# Patient Record
Sex: MALE | Race: BLACK OR AFRICAN AMERICAN | URBAN - METROPOLITAN AREA
[De-identification: names, ages, dates, MRNs, and addresses within clinical notes are randomized per-mention and may not be internally consistent; named-entity substitution may affect disease eponyms.]

---

## 2018-10-09 ENCOUNTER — HISTORICAL (OUTPATIENT)
Dept: INTERNAL MEDICINE | Facility: CLINIC | Age: 58
End: 2018-10-09

## 2018-10-09 LAB
ABS NEUT (OLG): 1.4 X10(3)/MCL (ref 2.1–9.2)
ALBUMIN SERPL-MCNC: 3.9 GM/DL (ref 3.4–5)
ALBUMIN/GLOB SERPL: 1 RATIO (ref 1–2)
ALP SERPL-CCNC: 82 UNIT/L (ref 45–117)
ALT SERPL-CCNC: 78 UNIT/L (ref 12–78)
APPEARANCE, UA: CLEAR
AST SERPL-CCNC: 54 UNIT/L (ref 15–37)
BACTERIA #/AREA URNS AUTO: ABNORMAL /[HPF]
BASOPHILS # BLD AUTO: 0.04 X10(3)/MCL
BASOPHILS NFR BLD AUTO: 1 %
BILIRUB SERPL-MCNC: 0.4 MG/DL (ref 0.2–1)
BILIRUB UR QL STRIP: NEGATIVE
BILIRUBIN DIRECT+TOT PNL SERPL-MCNC: 0.1 MG/DL
BILIRUBIN DIRECT+TOT PNL SERPL-MCNC: 0.3 MG/DL
BUN SERPL-MCNC: 17 MG/DL (ref 7–18)
CALCIUM SERPL-MCNC: 9.2 MG/DL (ref 8.5–10.1)
CHLORIDE SERPL-SCNC: 98 MMOL/L (ref 98–107)
CHOLEST SERPL-MCNC: 209 MG/DL
CHOLEST/HDLC SERPL: 4.5 {RATIO} (ref 0–5)
CO2 SERPL-SCNC: 31 MMOL/L (ref 21–32)
COLOR UR: YELLOW
CREAT SERPL-MCNC: 1 MG/DL (ref 0.6–1.3)
EOSINOPHIL # BLD AUTO: 0.13 X10(3)/MCL
EOSINOPHIL NFR BLD AUTO: 3 %
ERYTHROCYTE [DISTWIDTH] IN BLOOD BY AUTOMATED COUNT: 12.3 % (ref 11.5–14.5)
GLOBULIN SER-MCNC: 4.5 GM/ML (ref 2.3–3.5)
GLUCOSE (UA): NORMAL
GLUCOSE SERPL-MCNC: 94 MG/DL (ref 74–106)
HCT VFR BLD AUTO: 40.5 % (ref 40–51)
HDLC SERPL-MCNC: 46 MG/DL
HGB BLD-MCNC: 13.5 GM/DL (ref 13.5–17.5)
HGB UR QL STRIP: NEGATIVE
HYALINE CASTS #/AREA URNS LPF: ABNORMAL /[LPF]
IMM GRANULOCYTES # BLD AUTO: 0.01 10*3/UL
IMM GRANULOCYTES NFR BLD AUTO: 0 %
KETONES UR QL STRIP: NEGATIVE
LDLC SERPL CALC-MCNC: 122 MG/DL (ref 0–130)
LEUKOCYTE ESTERASE UR QL STRIP: NEGATIVE
LYMPHOCYTES # BLD AUTO: 2.43 X10(3)/MCL
LYMPHOCYTES NFR BLD AUTO: 54 % (ref 13–40)
MCH RBC QN AUTO: 31.1 PG (ref 26–34)
MCHC RBC AUTO-ENTMCNC: 33.3 GM/DL (ref 31–37)
MCV RBC AUTO: 93.3 FL (ref 80–100)
MONOCYTES # BLD AUTO: 0.47 X10(3)/MCL
MONOCYTES NFR BLD AUTO: 10 % (ref 4–12)
NEUTROPHILS # BLD AUTO: 1.4 X10(3)/MCL
NEUTROPHILS NFR BLD AUTO: 31 X10(3)/MCL
NITRITE UR QL STRIP: NEGATIVE
PH UR STRIP: 6.5 [PH] (ref 4.5–8)
PLATELET # BLD AUTO: 222 X10(3)/MCL (ref 130–400)
PMV BLD AUTO: 8.7 FL (ref 7.4–10.4)
POTASSIUM SERPL-SCNC: 4 MMOL/L (ref 3.5–5.1)
PROT SERPL-MCNC: 8.4 GM/DL (ref 6.4–8.2)
PROT UR QL STRIP: NEGATIVE
PSA SERPL-MCNC: 0.2 NG/ML
RBC # BLD AUTO: 4.34 X10(6)/MCL (ref 4.5–5.9)
RBC #/AREA URNS AUTO: ABNORMAL /[HPF]
SODIUM SERPL-SCNC: 133 MMOL/L (ref 136–145)
SP GR UR STRIP: 1.02 (ref 1–1.03)
SQUAMOUS #/AREA URNS LPF: ABNORMAL /[LPF]
TRIGL SERPL-MCNC: 203 MG/DL
TSH SERPL-ACNC: 0.69 MIU/L (ref 0.36–3.74)
UROBILINOGEN UR STRIP-ACNC: NORMAL
VLDLC SERPL CALC-MCNC: 41 MG/DL
WBC # SPEC AUTO: 4.5 X10(3)/MCL (ref 4.5–11)
WBC #/AREA URNS AUTO: ABNORMAL /HPF

## 2018-11-12 ENCOUNTER — HISTORICAL (OUTPATIENT)
Dept: ADMINISTRATIVE | Facility: HOSPITAL | Age: 58
End: 2018-11-12

## 2018-11-12 LAB
ABS NEUT (OLG): 1.29 X10(3)/MCL (ref 2.1–9.2)
ALBUMIN SERPL-MCNC: 4.1 GM/DL (ref 3.4–5)
ALBUMIN/GLOB SERPL: 1 RATIO (ref 1–2)
ALP SERPL-CCNC: 110 UNIT/L (ref 45–117)
ALT SERPL-CCNC: 106 UNIT/L (ref 12–78)
AST SERPL-CCNC: 64 UNIT/L (ref 15–37)
BASOPHILS # BLD AUTO: 0.04 X10(3)/MCL
BASOPHILS NFR BLD AUTO: 1 %
BILIRUB SERPL-MCNC: 0.3 MG/DL (ref 0.2–1)
BILIRUBIN DIRECT+TOT PNL SERPL-MCNC: 0.1 MG/DL
BILIRUBIN DIRECT+TOT PNL SERPL-MCNC: 0.2 MG/DL
BUN SERPL-MCNC: 17 MG/DL (ref 7–18)
CALCIUM SERPL-MCNC: 9.2 MG/DL (ref 8.5–10.1)
CHLORIDE SERPL-SCNC: 100 MMOL/L (ref 98–107)
CO2 SERPL-SCNC: 29 MMOL/L (ref 21–32)
CREAT SERPL-MCNC: 1 MG/DL (ref 0.6–1.3)
EOSINOPHIL # BLD AUTO: 0.15 10*3/UL
EOSINOPHIL NFR BLD AUTO: 4 %
ERYTHROCYTE [DISTWIDTH] IN BLOOD BY AUTOMATED COUNT: 12.4 % (ref 11.5–14.5)
FERRITIN SERPL-MCNC: 563.6 NG/ML (ref 26–388)
GLOBULIN SER-MCNC: 5.1 GM/ML (ref 2.3–3.5)
GLUCOSE SERPL-MCNC: 106 MG/DL (ref 74–106)
HAV AB SER QL IA: REACTIVE
HBV CORE AB SERPL QL IA: NONREACTIVE
HBV SURFACE AB SER-ACNC: 30.1 MIU/ML
HBV SURFACE AB SERPL IA-ACNC: REACTIVE M[IU]/ML
HBV SURFACE AG SERPL QL IA: NEGATIVE
HCT VFR BLD AUTO: 45.9 % (ref 40–51)
HGB BLD-MCNC: 15.1 GM/DL (ref 13.5–17.5)
HIV 1+2 AB+HIV1 P24 AG SERPL QL IA: NONREACTIVE
INR PPP: 1.07 (ref 0.9–1.2)
IRON SATN MFR SERPL: 44.6 % (ref 15–50)
IRON SERPL-MCNC: 146 MCG/DL (ref 65–175)
LYMPHOCYTES # BLD AUTO: 2.09 X10(3)/MCL
LYMPHOCYTES NFR BLD AUTO: 54 % (ref 13–40)
MCH RBC QN AUTO: 30.9 PG (ref 26–34)
MCHC RBC AUTO-ENTMCNC: 32.9 GM/DL (ref 31–37)
MCV RBC AUTO: 93.9 FL (ref 80–100)
MONOCYTES # BLD AUTO: 0.31 X10(3)/MCL
MONOCYTES NFR BLD AUTO: 8 % (ref 4–12)
NEUTROPHILS # BLD AUTO: 1.29 X10(3)/MCL
NEUTROPHILS NFR BLD AUTO: 33 X10(3)/MCL
PLATELET # BLD AUTO: 285 X10(3)/MCL (ref 130–400)
PMV BLD AUTO: 8.9 FL (ref 7.4–10.4)
POTASSIUM SERPL-SCNC: 4 MMOL/L (ref 3.5–5.1)
PROT SERPL-MCNC: 9.2 GM/DL (ref 6.4–8.2)
PROTHROMBIN TIME: 13.2 SECOND(S) (ref 11.9–14.4)
RBC # BLD AUTO: 4.89 X10(6)/MCL (ref 4.5–5.9)
SODIUM SERPL-SCNC: 135 MMOL/L (ref 136–145)
T PALLIDUM AB SER QL: NONREACTIVE
T4 FREE SERPL-MCNC: 1.24 NG/DL (ref 0.76–1.46)
TIBC SERPL-MCNC: 327 MCG/DL (ref 250–450)
TRANSFERRIN SERPL-MCNC: 278 MG/DL (ref 200–360)
TSH SERPL-ACNC: 0.91 MIU/L (ref 0.36–3.74)
WBC # SPEC AUTO: 3.9 X10(3)/MCL (ref 4.5–11)

## 2019-01-29 ENCOUNTER — HISTORICAL (OUTPATIENT)
Dept: ADMINISTRATIVE | Facility: HOSPITAL | Age: 59
End: 2019-01-29

## 2019-03-07 ENCOUNTER — HISTORICAL (OUTPATIENT)
Dept: ADMINISTRATIVE | Facility: HOSPITAL | Age: 59
End: 2019-03-07

## 2019-03-07 LAB
ABS NEUT (OLG): 1.65 X10(3)/MCL (ref 2.1–9.2)
ALBUMIN SERPL-MCNC: 4 GM/DL (ref 3.4–5)
ALBUMIN/GLOB SERPL: 0.9 RATIO (ref 1.1–2)
ALP SERPL-CCNC: 91 UNIT/L (ref 45–117)
ALT SERPL-CCNC: 77 UNIT/L (ref 12–78)
AST SERPL-CCNC: 58 UNIT/L (ref 15–37)
BASOPHILS # BLD AUTO: 0.03 X10(3)/MCL
BASOPHILS NFR BLD AUTO: 1 %
BILIRUB SERPL-MCNC: 0.3 MG/DL (ref 0.2–1)
BILIRUBIN DIRECT+TOT PNL SERPL-MCNC: <0.1 MG/DL
BILIRUBIN DIRECT+TOT PNL SERPL-MCNC: >0.2 MG/DL
BUN SERPL-MCNC: 19 MG/DL (ref 7–18)
CALCIUM SERPL-MCNC: 9.2 MG/DL (ref 8.5–10.1)
CHLORIDE SERPL-SCNC: 104 MMOL/L (ref 98–107)
CO2 SERPL-SCNC: 29 MMOL/L (ref 21–32)
CREAT SERPL-MCNC: 1.1 MG/DL (ref 0.6–1.3)
EOSINOPHIL # BLD AUTO: 0.08 X10(3)/MCL
EOSINOPHIL NFR BLD AUTO: 2 %
ERYTHROCYTE [DISTWIDTH] IN BLOOD BY AUTOMATED COUNT: 12.3 % (ref 11.5–14.5)
GLOBULIN SER-MCNC: 4.7 GM/ML (ref 2.3–3.5)
GLUCOSE SERPL-MCNC: 67 MG/DL (ref 74–106)
HCT VFR BLD AUTO: 45.4 % (ref 40–51)
HGB BLD-MCNC: 14.9 GM/DL (ref 13.5–17.5)
IMM GRANULOCYTES # BLD AUTO: 0.01 10*3/UL
IMM GRANULOCYTES NFR BLD AUTO: 0 %
INR PPP: 1 (ref 0.9–1.2)
LYMPHOCYTES # BLD AUTO: 2.07 X10(3)/MCL
LYMPHOCYTES NFR BLD AUTO: 49 % (ref 13–40)
MCH RBC QN AUTO: 31.4 PG (ref 26–34)
MCHC RBC AUTO-ENTMCNC: 32.8 GM/DL (ref 31–37)
MCV RBC AUTO: 95.8 FL (ref 80–100)
MONOCYTES # BLD AUTO: 0.37 X10(3)/MCL
MONOCYTES NFR BLD AUTO: 9 % (ref 4–12)
NEUTROPHILS # BLD AUTO: 1.65 X10(3)/MCL
NEUTROPHILS NFR BLD AUTO: 39 X10(3)/MCL
PLATELET # BLD AUTO: 360 X10(3)/MCL (ref 130–400)
PMV BLD AUTO: 8.8 FL (ref 7.4–10.4)
POTASSIUM SERPL-SCNC: 4 MMOL/L (ref 3.5–5.1)
PROT SERPL-MCNC: 8.7 GM/DL (ref 6.4–8.2)
PROTHROMBIN TIME: 13.1 SECOND(S) (ref 11.9–14.4)
RBC # BLD AUTO: 4.74 X10(6)/MCL (ref 4.5–5.9)
SODIUM SERPL-SCNC: 139 MMOL/L (ref 136–145)
WBC # SPEC AUTO: 4.2 X10(3)/MCL (ref 4.5–11)

## 2019-12-18 ENCOUNTER — HISTORICAL (OUTPATIENT)
Dept: ADMINISTRATIVE | Facility: HOSPITAL | Age: 59
End: 2019-12-18

## 2019-12-18 LAB
CORTIS 1H P CHAL SERPL-SCNC: 39 MCG/DL
CORTIS 30M P CHAL SERPL-SCNC: 30 MCG/DL
CORTIS SERPL-SCNC: 14 MCG/DL
T4 FREE SERPL-MCNC: 1.03 NG/DL (ref 0.76–1.46)
TSH SERPL-ACNC: 0.82 MIU/L (ref 0.36–3.74)

## 2020-02-06 ENCOUNTER — HISTORICAL (OUTPATIENT)
Dept: ADMINISTRATIVE | Facility: HOSPITAL | Age: 60
End: 2020-02-06

## 2020-02-06 ENCOUNTER — HISTORICAL (OUTPATIENT)
Dept: RADIOLOGY | Facility: HOSPITAL | Age: 60
End: 2020-02-06

## 2020-02-06 LAB
ABS NEUT (OLG): 2.13 X10(3)/MCL (ref 2.1–9.2)
ALBUMIN SERPL-MCNC: 3.9 GM/DL (ref 3.4–5)
ALBUMIN/GLOB SERPL: 0.8 RATIO (ref 1.1–2)
ALP SERPL-CCNC: 63 UNIT/L (ref 45–117)
ALT SERPL-CCNC: 60 UNIT/L (ref 12–78)
AST SERPL-CCNC: 42 UNIT/L (ref 15–37)
BASOPHILS # BLD AUTO: 0 X10(3)/MCL (ref 0–0.2)
BASOPHILS NFR BLD AUTO: 0 %
BILIRUB SERPL-MCNC: 0.3 MG/DL (ref 0.2–1)
BILIRUBIN DIRECT+TOT PNL SERPL-MCNC: 0.1 MG/DL (ref 0–0.2)
BILIRUBIN DIRECT+TOT PNL SERPL-MCNC: 0.2 MG/DL
BUN SERPL-MCNC: 26 MG/DL (ref 7–18)
CALCIUM SERPL-MCNC: 9.3 MG/DL (ref 8.5–10.1)
CHLORIDE SERPL-SCNC: 101 MMOL/L (ref 98–107)
CO2 SERPL-SCNC: 28 MMOL/L (ref 21–32)
CORTIS SERPL-SCNC: 13 MCG/DL
CREAT SERPL-MCNC: 1.4 MG/DL (ref 0.6–1.3)
EOSINOPHIL # BLD AUTO: 0.1 X10(3)/MCL (ref 0–0.9)
EOSINOPHIL NFR BLD AUTO: 1 %
ERYTHROCYTE [DISTWIDTH] IN BLOOD BY AUTOMATED COUNT: 12.6 % (ref 11.5–14.5)
GLOBULIN SER-MCNC: 4.8 GM/ML (ref 2.3–3.5)
GLUCOSE SERPL-MCNC: 95 MG/DL (ref 74–106)
HAV AB SER QL IA: NONREACTIVE
HBV CORE AB SERPL QL IA: NONREACTIVE
HBV SURFACE AB SER-ACNC: 3.23 M[IU]/ML
HBV SURFACE AB SERPL IA-ACNC: NONREACTIVE M[IU]/ML
HBV SURFACE AG SERPL QL IA: NONREACTIVE
HCT VFR BLD AUTO: 43.2 % (ref 40–51)
HCV AB SERPL QL IA: REACTIVE
HGB BLD-MCNC: 13.8 GM/DL (ref 13.5–17.5)
HIV 1+2 AB+HIV1 P24 AG SERPL QL IA: NONREACTIVE
IMM GRANULOCYTES # BLD AUTO: 0.03 10*3/UL
IMM GRANULOCYTES NFR BLD AUTO: 0 %
INR PPP: 0.93 (ref 0.9–1.2)
LYMPHOCYTES # BLD AUTO: 2.9 X10(3)/MCL (ref 0.6–4.6)
LYMPHOCYTES NFR BLD AUTO: 52 %
MCH RBC QN AUTO: 30.7 PG (ref 26–34)
MCHC RBC AUTO-ENTMCNC: 31.9 GM/DL (ref 31–37)
MCV RBC AUTO: 96.2 FL (ref 80–100)
MONOCYTES # BLD AUTO: 0.4 X10(3)/MCL (ref 0.1–1.3)
MONOCYTES NFR BLD AUTO: 7 %
NEUTROPHILS # BLD AUTO: 2.13 X10(3)/MCL (ref 2.1–9.2)
NEUTROPHILS NFR BLD AUTO: 39 %
PLATELET # BLD AUTO: 331 X10(3)/MCL (ref 130–400)
PMV BLD AUTO: 8.6 FL (ref 7.4–10.4)
POTASSIUM SERPL-SCNC: 3.9 MMOL/L (ref 3.5–5.1)
PROT SERPL-MCNC: 8.7 GM/DL (ref 6.4–8.2)
PROTHROMBIN TIME: 12.4 SECOND(S) (ref 11.9–14.4)
RBC # BLD AUTO: 4.49 X10(6)/MCL (ref 4.5–5.9)
SODIUM SERPL-SCNC: 135 MMOL/L (ref 136–145)
T PALLIDUM AB SER QL: NONREACTIVE
WBC # SPEC AUTO: 5.5 X10(3)/MCL (ref 4.5–11)

## 2022-04-09 ENCOUNTER — HISTORICAL (OUTPATIENT)
Dept: ADMINISTRATIVE | Facility: HOSPITAL | Age: 62
End: 2022-04-09

## 2022-04-27 VITALS
BODY MASS INDEX: 25.58 KG/M2 | HEIGHT: 74 IN | WEIGHT: 199.31 LBS | OXYGEN SATURATION: 97 % | SYSTOLIC BLOOD PRESSURE: 138 MMHG | DIASTOLIC BLOOD PRESSURE: 90 MMHG

## 2022-05-02 NOTE — HISTORICAL [facility] CERNER
This is a historical note converted from Cererin. Formatting and pictures may have been removed.  Please reference Cererin for original formatting and attached multimedia. Chief Complaint  hcv re eval  History of Present Illness  Mr. Armando Valentin is a 60 y/o AAM here for Hep C initial visit. Dx ~3 years ago. Pt has transportation issues so pretreatment labs were done in 2018 and then patient missed several appts.? Pt will need updated labs today. Liver US was done this morning, but have not resulted. Risk factors include snorting cocaine. Pt denies IVDU, blood transfusion, or tattoos.?He states he drinks and uses crack occasionally, but has not used recently. Pt states he has a court date on 2/12/2020 for?an unknown charge that he may or may not do group home time.?He denies fever, headache, visual problems,?icterus, jaundice, N/V/D, esophageal varices, SOB, cough or abd pain.? BMI 25.. Pt is due for a Tdap and is amenable.? He is a smoker states he smokes ~ 3-4 cigs per day.  Review of Systems  Constitutional:negative unless stated in HPI  Eye: negative unless stated in HPI  ENMT: negative unless stated in HPI  Respiratory: negative unless stated in HPI  Cardiovascular: negative unless stated in HPI  Gastrointestinal: negative unless stated in HPI  Genitourinary: negative unless stated in HPI  Hema/Lymph: negative unless stated in HPI  Endocrine: negative unless stated in HPI  Immunologic: negative unless stated in HPI  Musculoskeletal: ambulates well without assistance  Integumentary: negative unless stated in HPI  Neurologic: negative unless stated in HPI  All Other ROS: ?AAOX3, no distress?  Physical Exam  Vitals & Measurements  T:?36.4? ?C (Oral)? HR:?67(Peripheral)? RR:?18? BP:?138/90?  HT:?188?cm? WT:?90.4?kg? BMI:?25.58?  Eye: PERRL, no icterus, normal conjunctivae  HENT: bilateral TM normal, normal pharynx pink, no tonsillar edema, no thrush, no sinus tenderness palpated, missing multiple times  Neck: no  LAD  Respiratory: CTA, BBS, no SOB, brisk capp refill  Cardiovascular: RRR, s1 s2 noted,?no chest pain, no edema,  Gastrointestinal: BS + 4 quads, soft NT, ND, neg CVAT bilateral, no organomegaly  Genitourinary: neg  Lymphatics: no ?LAD  Musculoskeletal: ABARCA well, no deformity  Integumentary: skin intact  Neurologic: CN II-IX intact???  Assessment/Plan  1.?Chronic hepatitis C?B18.2  Hep C w/u today  Hep C disease education and management discussed in depth  Blood precautions and sexual protection with condoms discussed. No sharing needles, razors, nail clippers or toothbrushes.  No more than 2 GM of Tylenol per day. Avoid alcohol and illicit drugs  RTC 6 weeks with Jackelin  Ordered:  1160F- Medication reconciliation completed during visit, Chronic hepatitis C  BMI 25.0-25.9,adult  Tobacco user  Encounter for vaccination, [facility], 02/06/20 9:59:00 CST  Antinuclear Antibodies by [facility] [id number], Routine collect, *Est. 02/06/20 3:00:00 CST, Blood, Order for future visit, *Est. Stop date 02/06/20 3:00:00 CST, Lab Collect, Chronic hepatitis C, 02/06/20 3:00:00 CST  CBC w/ Auto Diff, Routine collect, *Est. 02/06/20 3:00:00 CST, Blood, Order for future visit, *Est. Stop date 02/06/20 3:00:00 CST, Lab Collect, Chronic hepatitis C, 02/06/20 3:00:00 CST  Clinic Follow up, *Est. 03/19/20 3:00:00 CDT, Order for future visit, Chronic hepatitis C  BMI 25.0-25.9,adult  Tobacco user, [facility]  Comprehensive Metabolic Panel, Routine collect, *Est. 02/06/20 3:00:00 CST, Blood, Order for future visit, *Est. Stop date 02/06/20 3:00:00 CST, Lab Collect, Chronic hepatitis C, 02/06/20 3:00:00 CST  HCV High-Res Genotype by Sequencing-[facility], Routine collect, *Est. 02/06/20 3:00:00 CST, Blood, Order for future visit, *Est. Stop date 02/06/20 3:00:00 CST, Lab Collect, Chronic hepatitis C, 02/06/20 3:00:00 CST  Hepatitis A Antibody Total, Routine collect, *Est. 02/06/20 3:00:00 CST, Blood, Order for future visit, *Est. Stop  date 02/06/20 3:00:00 CST, Lab Collect, Chronic hepatitis C, 02/06/20 3:00:00 CST  Hepatitis B Core Antibody Total, Routine collect, *Est. 02/06/20 3:00:00 CST, Blood, Order for future visit, *Est. Stop date 02/06/20 3:00:00 CST, Lab Collect, Chronic hepatitis C, 02/06/20 3:00:00 CST  Hepatitis B Surface Antibody, Routine collect, *Est. 02/06/20 3:00:00 CST, Blood, Order for future visit, *Est. Stop date 02/06/20 3:00:00 CST, Lab Collect, Chronic hepatitis C, 02/06/20 3:00:00 CST  Hepatitis B Surface Antigen, Routine collect, *Est. 02/06/20 3:00:00 CST, Blood, Order for future visit, *Est. Stop date 02/06/20 3:00:00 CST, Lab Collect, Chronic hepatitis C, 02/06/20 3:00:00 CST  Hepatitis C Antibody, Routine collect, *Est. 02/06/20 3:00:00 CST, Blood, Order for future visit, *Est. Stop date 02/06/20 3:00:00 CST, Lab Collect, Chronic hepatitis C, 02/06/20 3:00:00 CST  Hepatitis C Virus RNA Quant [id number], Routine collect, *Est. 02/06/20 3:00:00 CST, Blood, Order for future visit, *Est. Stop date 02/06/20 3:00:00 CST, Lab Collect, Chronic hepatitis C, 02/06/20 3:00:00 CST  HIV 1 and 2, Routine collect, *Est. 02/06/20 3:00:00 CST, Blood, Order for future visit, *Est. Stop date 02/06/20 3:00:00 CST, Lab Collect, Chronic hepatitis C, 02/06/20 3:00:00 CST  Liver Fibrosis Chron Viral Hepatit-[facility] [id number], Routine collect, *Est. 02/06/20 3:00:00 CST, Blood, Order for future visit, *Est. Stop date 02/06/20 3:00:00 CST, Lab Collect, Chronic hepatitis C, 02/06/20 3:00:00 CST  Office/Outpatient Visit Level 4 Established 20729 PC, Chronic hepatitis C  BMI 25.0-25.9,adult  Tobacco user  Encounter for vaccination, [facility], 02/06/20 9:59:00 CST  PT, Routine collect, *Est. 02/06/20 3:00:00 CST, Blood, Order for future visit, *Est. Stop date 02/06/20 3:00:00 CST, Lab Collect, Chronic hepatitis C, 02/06/20 3:00:00 CST  Syphilis Antibody (with Reflex RPR), Routine collect, *Est. 02/06/20 3:00:00 CST, Blood, Order for  future visit, *Est. Stop date 02/06/20 3:00:00 CST, Lab Collect, Chronic hepatitis C, 02/06/20 3:00:00 CST  ?  2.?BMI 25.0-25.9,adult?Z68.25  BMI 25. Pt counseled on following a low calorie diet to decrease BMI below 25.  Ordered:  1160F- Medication reconciliation completed during visit, Chronic hepatitis C  BMI 25.0-25.9,adult  Tobacco user  Encounter for vaccination, [facility], 02/06/20 9:59:00 CST  Clinic Follow up, *Est. 03/19/20 3:00:00 CDT, Order for future visit, Chronic hepatitis C  BMI 25.0-25.9,adult  Tobacco user, [facility]  Office/Outpatient Visit Level 4 Established 06399 PC, Chronic hepatitis C  BMI 25.0-25.9,adult  Tobacco user  Encounter for vaccination, [facility], 02/06/20 9:59:00 CST  ?  3.?Tobacco user?Z72.0  Pt encouraged to stop smoking.??  Ordered:  1160F- Medication reconciliation completed during visit, Chronic hepatitis C  BMI 25.0-25.9,adult  Tobacco user  Encounter for vaccination, [facility], 02/06/20 9:59:00 CST  Clinic Follow up, *Est. 03/19/20 3:00:00 CDT, Order for future visit, Chronic hepatitis C  BMI 25.0-25.9,adult  Tobacco user, [facility]  Office/Outpatient Visit Level 4 Established 90151 , Chronic hepatitis C  BMI 25.0-25.9,adult  Tobacco user  Encounter for vaccination, [facility], 02/06/20 9:59:00 CST  ?  4.?Encounter for vaccination?Z23  Ordered:  tetanus/diphth/pertuss (Tdap) adult/adol, 0.5 mL, form: Injection, IM, Once-Unscheduled, first dose 02/06/20 10:00:00 CST  1160F- Medication reconciliation completed during visit, Chronic hepatitis C  BMI 25.0-25.9,adult  Tobacco user  Encounter for vaccination, [facility], 02/06/20 9:59:00 CST  Office/Outpatient Visit Level 4 Established 28063 PC, Chronic hepatitis C  BMI 25.0-25.9,adult  Tobacco user  Encounter for vaccination, [facility], 02/06/20 9:59:00 CST  ?  Referrals  Clinic Follow up, *Est. 03/19/20 3:00:00 CDT, Order for future visit, Chronic hepatitis C  BMI 25.0-25.9,adult   Tobacco user, [facility]   Problem List/Past Medical History  Ongoing  Amphetamine user  BMI 25.0-25.9,adult  CKD (chronic kidney disease) stage 2, GFR 60-89 ml/min  Cocaine use  Depression with anxiety  Elevated blood protein  Elevated liver enzymes  Hepatitis C  Hypertension  Hypokalemia  Hyponatremia  Insomnia  Knee pain  Knowledge deficit  Mixed hyperlipidemia  Right knee DJD  Tobacco user  Wellness examination  Historical  No qualifying data  Procedure/Surgical History  Monitoring of Central Nervous Electrical Activity, External Approach (11/15/2019)  biposy  right knee sx   Medications  Boostrix (Tdap) intramuscular suspension, 0.5 mL, IM, Once-Unscheduled  diclofenac sodium 75 mg oral delayed release tablet, 75 mg= 1 tab(s), Oral, BID  Allergies  No Known Allergies  No Known Medication Allergies  Social History  Abuse/Neglect  No, No, Yes, 02/06/2020  Alcohol  Current, Beer, 1-2 times per month, 11/04/2019  Employment/School  Unemployed, Highest education level: High school., 05/10/2016  Home/Environment  Lives with men s shelter. Living situation: Homeless/Shelter. Alcohol abuse in household: No. Substance abuse in household: No. Smoker in household: Yes. Injuries/Abuse/Neglect in household: No. Feels unsafe at home: No. Safe place to go: Yes. Agency(s)/Others notified: No. Family/Friends available for support: No. Concern for family members at home: No., 05/10/2016  Nutrition/Health  Regular, Wants to lose weight: No. Sleeping concerns: Yes. Feels highly stressed: Yes., 05/10/2016  Sexual  Gender Identity Identifies as male., 05/07/2019  Substance Use  Past, Cocaine, Marijuana, 1-2 times per month, 02/04/2020  Tobacco  4 or less cigarettes(less than 1/4 pack)/day in last 30 days, Yes, 02/06/2020  Family History  Anxiety.: Mother.  Stroke: Sister.  Immunizations  Vaccine Date Status Comments   influenza virus vaccine, inactivated 11/16/2019 Given    influenza virus vaccine, inactivated 10/11/2019  Recorded    influenza virus vaccine, inactivated 10/10/2018 Given    hepatitis A-hepatitis B vaccine 08/22/2018 Recorded    influenza virus vaccine, inactivated 12/01/2017 Given tolerated well   Health Maintenance  Health Maintenance  ???Pending?(in the next year)  ??? ??OverDue  ??? ? ? ?Diabetes Screening due??and every?  ??? ??Due?  ??? ? ? ?Alcohol Misuse Screening due??01/01/20??and every 1??year(s)  ??? ? ? ?Aspirin Therapy for CVD Prevention due??02/06/20??and every 1??year(s)  ??? ? ? ?Hypertension Maintenance-Medication Prescribed due??02/06/20??and every 1??year(s)  ??? ? ? ?Hypertension Management-Education due??02/06/20??and every 1??year(s)  ??? ? ? ?Lung Cancer Screening due??02/06/20??and every 1??year(s)  ??? ? ? ?Tetanus Vaccine due??02/06/20??and every 10??year(s)  ??? ??Due In Future?  ??? ? ? ?Colorectal Screening not due until??11/14/20??and every 1??year(s)  ??? ? ? ?Hypertension Management-BMP not due until??11/17/20??and every 1??year(s)  ??? ? ? ?ADL Screening not due until??11/17/20??and every 1??year(s)  ??? ? ? ?Obesity Screening not due until??01/01/21??and every 1??year(s)  ??? ? ? ?Smoking Cessation not due until??01/01/21??and every 1??year(s)  ??? ? ? ?Blood Pressure Screening not due until??02/05/21??and every 1??year(s)  ??? ? ? ?Body Mass Index Check not due until??02/05/21??and every 1??year(s)  ??? ? ? ?Hypertension Management-Blood Pressure not due until??02/05/21??and every 1??year(s)  ???Satisfied?(in the past 1 year)  ??? ??Satisfied?  ??? ? ? ?ADL Screening on??11/17/19.??Satisfied by Kevin Koroma LPN.  ??? ? ? ?Alcohol Misuse Screening on??03/07/19.??Satisfied by Fred Sanchez LPN  ??? ? ? ?Blood Pressure Screening on??02/06/20.??Satisfied by Nnamdi Gordon  ??? ? ? ?Body Mass Index Check on??02/06/20.??Satisfied by Nnamdi Gordon  ??? ? ? ?Colorectal Screening on??11/15/19.??Satisfied by Orquidea Almaraz  ??? ? ? ?Depression Screening on??02/06/20.??Satisfied by  Nnamdi Gordon  ??? ? ? ?Diabetes Screening on??11/18/19.??Satisfied by Munira Odom  ??? ? ? ?Hypertension Management-Blood Pressure on??02/06/20.??Satisfied by Nnamdi Gordon  ??? ? ? ?Influenza Vaccine on??11/16/19.??Satisfied by Kim Chavis LPN  ??? ? ? ?Obesity Screening on??02/06/20.??Satisfied by Nnamdi Gordon  ??? ? ? ?Smoking Cessation on??01/03/20.??Satisfied by Nnamdi Gordon  ?

## 2022-05-02 NOTE — HISTORICAL [facility] CERNER
This is a historical note converted from Alexandru. Formatting and pictures may have been removed.  Please reference Alexandru for original formatting and attached multimedia. Chief Complaint  New HCV  History of Present Illness  Mr. Roberts is a 57 y/o  male here today for initial Hep C visit. Dx 5/2016.? Risk factor is unknown as patient denies IVDU, tattoos, blood transfusion or a partner with Hep C.? 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 Pt denies fever, headache, visual problems,?icterus, jaundice, N/V/D, esophageal varices, SOB, cough or abd pain. He complains of fatigue related to dx.? Pt denies any alcohol or drug abuse.? Pretreatment labs done 11/12/18 Hep C VL 7.37, Gt1a, F3, APRI 0.561, Child [place] A, with a life expectancy of greater than 12 months.? Pt is very eager to start treatment. He will need updated labs and liver US. Pt is smoker. States he smokes cigarettes when he can afford them.  Review of Systems  Constitutional:negative unless stated in HPI  Eye: negative unless stated in HPI  ENMT: negative unless stated in HPI  Respiratory: negative unless stated in HPI  Cardiovascular: negative unless stated in HPI  Gastrointestinal: negative unless stated in HPI  Genitourinary: negative unless stated in HPI  Hema/Lymph: negative unless stated in HPI  Endocrine: negative unless stated in HPI  Immunologic: negative unless stated in HPI  Musculoskeletal: ambulates well without assistance  Integumentary: negative unless stated in HPI  Neurologic: negative unless stated in HPI  All Other ROS: ?AAOX3, no distress?  Physical Exam  Vitals & Measurements  T:?36.5? ?C (Oral)? HR:?67(Peripheral)? RR:?20? BP:?123/85? SpO2:?97%?  HT:?188?cm? WT:?92.9?kg? BMI:?26.28?  Eye: PERRL, no icterus, normal conjunctivae  HENT: bilateral TM normal, normal pharynx pink, no tonsillar edema, no thrush, no sinus tenderness palpated  Neck: no LAD  Respiratory: CTA, BBS, no SOB, brisk  capp refill  Cardiovascular: RRR, s1 s2 noted,?no chest pain, no edema,  Gastrointestinal: BS + 4 quads, soft NT, ND, neg CVAT bilateral, no organmegaly  Genitourinary: neg  Lymphatics: no ?LAD  Musculoskeletal: ABARCA well, no deformity  Integumentary: skin intact  Neurologic: CN II-IX intact???  Assessment/Plan  1.?Hepatitis C, chronic?B18.2  Hep C disease education and management discussed in depth  Blood precautions and sexual protection with condoms discussed. No sharing needles, razors, nail clippers or toothbrushes. No more than 2 GM of Tylenol per day. Avoid alcohol and illicit drugs.  Long discussion regarding available treatment options to include potential risk versus benefits as well as potential adverse effects. Reviewed trial data to include AASLD/IDSA guidelines and available studies - excellent cure rates for pangenotypic disease with Mavyret. All questions addressed at length. Patient voices understanding and is in agreement to proceed. Will plan to start Mavyret 3 po q? day x 8 weeks once labs and liver US?are completed.?Refer to PAP for medication assistance if needed. Formal HCV  RN education visit today. Return to clinic 4 weeks after starting meds. HCV  to order all labs and follow-up per HCV protocol.  ?  Ordered:  1160F- Medication reconciliation completed during visit, Hepatitis C, chronic  Tobacco user, [facility], 03/07/19 8:30:00 CST  CBC w/ Auto Diff, Routine collect, *Est. 03/07/19 3:00:00 CST, Blood, Order for future visit, *Est. Stop date 03/07/19 3:00:00 CST, Lab Collect, Hepatitis C, chronic, 03/07/19 8:29:00 CST  Comprehensive Metabolic Panel, Routine collect, *Est. 03/07/19 3:00:00 CST, Blood, Order for future visit, *Est. Stop date 03/07/19 3:00:00 CST, Lab Collect, Hepatitis C, chronic, 03/07/19 8:29:00 CST  Hepatitis C Virus RNA Quant [id number], Routine collect, *Est. 03/07/19 3:00:00 CST, Blood, Order for future visit, *Est. Stop date  03/07/19 3:00:00 CST, Lab Collect, Hepatitis C, chronic, 03/07/19 8:29:00 CST  Office/Outpatient Visit Level 4 Established [id number] PC, Hepatitis C, chronic  Tobacco user, [facility], 03/07/19 8:29:00 CST  PT, Routine collect, *Est. 03/07/19 3:00:00 CST, Blood, Order for future visit, *Est. Stop date 03/07/19 3:00:00 CST, Lab Collect, Hepatitis C, chronic, 03/07/19 8:29:00 CST  US Abdomen Limited, Routine, *Est. 03/07/19 3:00:00 CST, Hepatitis, None, Ambulatory, Rad Type, Order for future visit, Hepatitis C, chronic, Schedule this test, [facility], *Est. 03/07/19 3:00:00 CST  ?  2.?Tobacco user?Z72.0  Pt encouraged to stop smoking.??  Ordered:  1160F- Medication reconciliation completed during visit, Hepatitis C, chronic  Tobacco user, [facility], 03/07/19 8:30:00 CST  Office/Outpatient Visit Level 4 Established 77225 PC, Hepatitis C, chronic  Tobacco user, [facility], 03/07/19 8:29:00 CST  ?   Problem List/Past Medical History  Ongoing  Knee pain  Knowledge deficit  Tobacco user  Historical  No qualifying data  Procedure/Surgical History  biposy  right knee sx   Medications  mirtazapine 30 mg oral tablet, 30 mg= 1 tab(s), Oral, qPM  Mobic 15 mg oral tablet, 15 mg= 1 tab(s), Oral, Daily, 3 refills  Allergies  No Known Medication Allergies  Social History  Alcohol  Never, 02/05/2016  Employment/School  Unemployed, Highest education level: High school., 05/10/2016  Home/Environment  Lives with men s shelter. Living situation: Homeless/Shelter. Alcohol abuse in household: No. Substance abuse in household: No. Smoker in household: Yes. Injuries/Abuse/Neglect in household: No. Feels unsafe at home: No. Safe place to go: Yes. Agency(s)/Others notified: No. Family/Friends available for support: No. Concern for family members at home: No., 05/10/2016  Nutrition/Health  Regular, Wants to lose weight: No. Sleeping concerns: Yes. Feels highly stressed: Yes., 05/10/2016  Substance Abuse  Past, Cocaine,  05/11/2018  Past, Marijuana, 05/10/2016  Tobacco  4 or less cigarettes(less than 1/4 pack)/day in last 30 days, Cigarettes, No, 2 per day. Started age 18 Years., 03/07/2019  Family History  Anxiety.: Mother.  Immunizations  Vaccine Date Status Comments   influenza virus vaccine, inactivated 10/10/2018 Given    hepatitis A-hepatitis B vaccine 08/22/2018 Recorded    influenza virus vaccine, inactivated 12/01/2017 Given tolerated well   Health Maintenance  Health Maintenance  ???Pending?(in the next year)  ??? ??OverDue  ??? ? ? ?Diabetes Screening due??and every?  ??? ? ? ?Colorectal Screening due??05/12/17??and every 1??year(s)  ??? ? ? ?Smoking Cessation due??12/01/18??and every 1??year(s)  ??? ??Due?  ??? ? ? ?Aspirin Therapy for CVD Prevention due??03/07/19??and every 1??year(s)  ??? ? ? ?Lung Cancer Screening due??03/07/19??and every 1??year(s)  ??? ? ? ?Tetanus Vaccine due??03/07/19??and every 10??year(s)  ??? ??Due In Future?  ??? ? ? ?Depression Screening not due until??10/10/19??and every 1??year(s)  ??? ? ? ?Hypertension Management-BMP not due until??11/12/19??and every 1??year(s)  ??? ? ? ?Blood Pressure Screening not due until??03/06/20??and every 1??year(s)  ??? ? ? ?Body Mass Index Check not due until??03/06/20??and every 1??year(s)  ??? ? ? ?Hypertension Management-Blood Pressure not due until??03/06/20??and every 1??year(s)  ???Satisfied?(in the past 1 year)  ??? ??Satisfied?  ??? ? ? ?ADL Screening on??03/07/19.??Satisfied by Fred Sanchez LPN  ??? ? ? ?Alcohol Misuse Screening on??03/07/19.??Satisfied by Fred Sanchez LPN  ??? ? ? ?Blood Pressure Screening on??03/07/19.??Satisfied by Fred Sanchez LPN  ??? ? ? ?Body Mass Index Check on??03/07/19.??Satisfied by Fred Sanchez LPN  ??? ? ? ?Depression Screening on??10/10/18.??Satisfied by Sangeeta Ray LPN  ??? ? ? ?Diabetes Screening on??11/12/18.??Satisfied by Charlotte Foster  ??? ? ? ?Hypertension Management-Blood Pressure  on??03/07/19.??Satisfied by Fred Sanchez LPN  ??? ? ? ?Influenza Vaccine on??10/10/18.??Satisfied by Malorie Umanzor LPN  ??? ? ? ?Lipid Screening on??11/12/18.??Satisfied by Contributor[facility]system, [facility]  ??? ? ? ?Obesity Screening on??03/07/19.??Satisfied by Fred Sanchez LPN  ?  ?  Lab Results  Test Name Test Result Date/Time Comments   BUN 17 mg/dL 11/12/2018 11:20 CST    Creatinine 1.00 mg/dL 11/12/2018 11:20 CST    eGFR-AA 99 mL/min 11/12/2018 11:20 CST    AST 64 unit/L (High) 11/12/2018 11:20 CST     unit/L (High) 11/12/2018 11:20 CST    GGT- IU/L (High) 11/12/2018 11:20 CST    Alk Phos 110 unit/L 11/12/2018 11:20 CST    Iron Lvl 146 mcg/dL 11/12/2018 11:20 CST    Transferrin 278.0 mg/dL 11/12/2018 11:20 CST    TIBC 327 mcg/dL 11/12/2018 11:20 CST    Iron Sat 44.6 % 11/12/2018 11:20 CST    Ferritin Lvl 563.6 ng/mL (High) 11/12/2018 11:20 CST    APO A-1  mg/dL (High) 11/12/2018 11:20 CST    T4 Free 1.24 ng/dL 11/12/2018 11:20 CST    TSH 0.912 mIU/L 11/12/2018 11:20 CST    PT 13.2 second(s) 11/12/2018 11:20 CST Low intensity therapy  PT:?? 17.9-22.2 seconds  INR:? 1.5- 2.0  ?  Mod. intensity therapy  PT:?? 22.2- 30.2 SECONDS  INR:?? 2.0-3.0  ?  High intensity therapy  PT:?? 26.3 - 34 SEC  INR:?? 2.5-3.5   INR 1.07 11/12/2018 11:20 CST    WBC 3.9 x10(3)/mcL (Low) 11/12/2018 11:20 CST    Hgb 15.1 gm/dL 11/12/2018 11:20 CST    Hct 45.9 % 11/12/2018 11:20 CST    Platelet 285 x10(3)/mcL 11/12/2018 11:20 CST    Syphilis Ab Nonreactive 11/12/2018 11:20 CST    HIV Nonreactive 11/12/2018 11:20 CST    Hep A Ab Reactive (Abnormal) 11/12/2018 11:20 CST    Hep B Core Ab Nonreactive 11/12/2018 11:20 CST    Hep Bs Ab Reactive 11/12/2018 11:20 CST Immune.   Hep Bs Ag Negative 11/12/2018 11:20 CST    HCV HR [facility] 1a 11/12/2018 11:20 CST INTERPRETIVE INFORMATION: Hepatitis C High Resolution  Genotype  Hepatitis C viral RNA is assayed using reverse  transcription polymerase chain reaction  (RT-PCR) to amplify  specific portions of both the Core and NS5B regions of the  viral genome. The amplified nucleic acid is sequenced  bi-directionally using dye-terminator chemistry (SpanDeX).  Sequencing data is compared to a database of characterized  sequences.  ?  Isolates of hepatitis C virus are grouped into six major  genotypes(1-6). These genotypes are subtyped according to  sequence characteristics. Sequencing both the Core and NS5B  regions allows for subtyping of all confirmed and most  provisional genotypes, including differentiation of 1a from  1b and typing of genotype 6.  Test developed and characteristics determined by [employer]. See Compliance Statement B: [employer]/CS  Performed by [facility], ? ? ? ? ? ? ? ? ? ? ? ? ? ?  [address], [facility] [address] [phone] ? ? ? ? ? ? ? ? ?  [email or web address], Telly Nye MD - Lab. Director   ANAHI by IFA-LC Negative 11/12/2018 11:20 CST ? ? ? ? ? ? ? ? ? ? ? ? ? ? ? ? ? ? Negative ? <1:80  ? ? ? ? ? ? ? ? ? ? ? ? ? ? ? ? ? ? Borderline ?1:80  ? ? ? ? ? ? ? ? ? ? ? ? ? ? ? ? ? ? Positive ? >1:80  Performed At:  [facility] [id number]  Mirta QURESHI MD Ph:[phone]   Hep C Qnt-LC [id number] IU/mL 11/12/2018 11:20 CST    HCV brx41-TH 6.867 LogIU/mL 11/12/2018 11:20 CST    Fibrosis Score-LC 0.72 (High) 11/12/2018 11:20 CST    Necroinflam Act Score-LC 0.70 (High) 11/12/2018 11:20 CST